# Patient Record
Sex: FEMALE | Race: WHITE | NOT HISPANIC OR LATINO
[De-identification: names, ages, dates, MRNs, and addresses within clinical notes are randomized per-mention and may not be internally consistent; named-entity substitution may affect disease eponyms.]

---

## 2023-05-16 ENCOUNTER — ASOB RESULT (OUTPATIENT)
Age: 33
End: 2023-05-16

## 2023-05-16 ENCOUNTER — TRANSCRIPTION ENCOUNTER (OUTPATIENT)
Age: 33
End: 2023-05-16

## 2023-05-16 ENCOUNTER — APPOINTMENT (OUTPATIENT)
Dept: ANTEPARTUM | Facility: CLINIC | Age: 33
End: 2023-05-16
Payer: COMMERCIAL

## 2023-05-16 PROCEDURE — 76805 OB US >/= 14 WKS SNGL FETUS: CPT

## 2023-05-16 PROCEDURE — 76817 TRANSVAGINAL US OBSTETRIC: CPT

## 2023-06-20 ENCOUNTER — APPOINTMENT (OUTPATIENT)
Dept: ANTEPARTUM | Facility: CLINIC | Age: 33
End: 2023-06-20
Payer: COMMERCIAL

## 2023-06-20 ENCOUNTER — ASOB RESULT (OUTPATIENT)
Age: 33
End: 2023-06-20

## 2023-06-20 PROCEDURE — 76811 OB US DETAILED SNGL FETUS: CPT

## 2023-06-27 ENCOUNTER — APPOINTMENT (OUTPATIENT)
Dept: ANTEPARTUM | Facility: CLINIC | Age: 33
End: 2023-06-27
Payer: COMMERCIAL

## 2023-06-27 ENCOUNTER — ASOB RESULT (OUTPATIENT)
Age: 33
End: 2023-06-27

## 2023-06-27 PROCEDURE — 76816 OB US FOLLOW-UP PER FETUS: CPT

## 2023-08-22 ENCOUNTER — APPOINTMENT (OUTPATIENT)
Dept: ANTEPARTUM | Facility: CLINIC | Age: 33
End: 2023-08-22
Payer: COMMERCIAL

## 2023-08-22 ENCOUNTER — ASOB RESULT (OUTPATIENT)
Age: 33
End: 2023-08-22

## 2023-08-22 PROCEDURE — 76816 OB US FOLLOW-UP PER FETUS: CPT

## 2023-08-22 PROCEDURE — 76819 FETAL BIOPHYS PROFIL W/O NST: CPT

## 2023-10-03 ENCOUNTER — APPOINTMENT (OUTPATIENT)
Dept: ANTEPARTUM | Facility: CLINIC | Age: 33
End: 2023-10-03
Payer: COMMERCIAL

## 2023-10-03 ENCOUNTER — ASOB RESULT (OUTPATIENT)
Age: 33
End: 2023-10-03

## 2023-10-03 PROCEDURE — 76816 OB US FOLLOW-UP PER FETUS: CPT

## 2023-10-03 PROCEDURE — 76820 UMBILICAL ARTERY ECHO: CPT | Mod: 59

## 2023-10-03 PROCEDURE — 76819 FETAL BIOPHYS PROFIL W/O NST: CPT | Mod: 59

## 2023-10-12 ENCOUNTER — INPATIENT (INPATIENT)
Facility: HOSPITAL | Age: 33
LOS: 2 days | Discharge: HOME | End: 2023-10-15
Attending: OBSTETRICS & GYNECOLOGY | Admitting: OBSTETRICS & GYNECOLOGY
Payer: COMMERCIAL

## 2023-10-12 DIAGNOSIS — Z3A.00 WEEKS OF GESTATION OF PREGNANCY NOT SPECIFIED: ICD-10-CM

## 2023-10-12 DIAGNOSIS — O26.899 OTHER SPECIFIED PREGNANCY RELATED CONDITIONS, UNSPECIFIED TRIMESTER: ICD-10-CM

## 2023-10-13 VITALS — HEIGHT: 68 IN | WEIGHT: 169.09 LBS

## 2023-10-13 LAB
BASOPHILS # BLD AUTO: 0.03 K/UL — SIGNIFICANT CHANGE UP (ref 0–0.2)
BASOPHILS NFR BLD AUTO: 0.2 % — SIGNIFICANT CHANGE UP (ref 0–2)
BLD GP AB SCN SERPL QL: NEGATIVE — SIGNIFICANT CHANGE UP
EOSINOPHIL # BLD AUTO: 0.02 K/UL — SIGNIFICANT CHANGE UP (ref 0–0.5)
EOSINOPHIL NFR BLD AUTO: 0.1 % — SIGNIFICANT CHANGE UP (ref 0–6)
HCT VFR BLD CALC: 32.8 % — LOW (ref 34.5–45)
HGB BLD-MCNC: 11.3 G/DL — LOW (ref 11.5–15.5)
IMM GRANULOCYTES NFR BLD AUTO: 0.7 % — SIGNIFICANT CHANGE UP (ref 0–0.9)
LYMPHOCYTES # BLD AUTO: 1.69 K/UL — SIGNIFICANT CHANGE UP (ref 1–3.3)
LYMPHOCYTES # BLD AUTO: 11.1 % — LOW (ref 13–44)
MCHC RBC-ENTMCNC: 30.4 PG — SIGNIFICANT CHANGE UP (ref 27–34)
MCHC RBC-ENTMCNC: 34.5 GM/DL — SIGNIFICANT CHANGE UP (ref 32–36)
MCV RBC AUTO: 88.2 FL — SIGNIFICANT CHANGE UP (ref 80–100)
MONOCYTES # BLD AUTO: 0.65 K/UL — SIGNIFICANT CHANGE UP (ref 0–0.9)
MONOCYTES NFR BLD AUTO: 4.3 % — SIGNIFICANT CHANGE UP (ref 2–14)
NEUTROPHILS # BLD AUTO: 12.71 K/UL — HIGH (ref 1.8–7.4)
NEUTROPHILS NFR BLD AUTO: 83.6 % — HIGH (ref 43–77)
NRBC # BLD: 0 /100 WBCS — SIGNIFICANT CHANGE UP (ref 0–0)
PLATELET # BLD AUTO: 201 K/UL — SIGNIFICANT CHANGE UP (ref 150–400)
RBC # BLD: 3.72 M/UL — LOW (ref 3.8–5.2)
RBC # FLD: 13.4 % — SIGNIFICANT CHANGE UP (ref 10.3–14.5)
RH IG SCN BLD-IMP: POSITIVE — SIGNIFICANT CHANGE UP
RH IG SCN BLD-IMP: POSITIVE — SIGNIFICANT CHANGE UP
T PALLIDUM AB TITR SER: NEGATIVE — SIGNIFICANT CHANGE UP
WBC # BLD: 15.2 K/UL — HIGH (ref 3.8–10.5)
WBC # FLD AUTO: 15.2 K/UL — HIGH (ref 3.8–10.5)

## 2023-10-13 RX ORDER — FAMOTIDINE 10 MG/ML
20 INJECTION INTRAVENOUS ONCE
Refills: 0 | Status: COMPLETED | OUTPATIENT
Start: 2023-10-13 | End: 2023-10-13

## 2023-10-13 RX ORDER — CITRIC ACID/SODIUM CITRATE 300-500 MG
15 SOLUTION, ORAL ORAL EVERY 6 HOURS
Refills: 0 | Status: DISCONTINUED | OUTPATIENT
Start: 2023-10-13 | End: 2023-10-13

## 2023-10-13 RX ORDER — SODIUM CHLORIDE 9 MG/ML
1000 INJECTION, SOLUTION INTRAVENOUS
Refills: 0 | Status: DISCONTINUED | OUTPATIENT
Start: 2023-10-13 | End: 2023-10-13

## 2023-10-13 RX ORDER — MAGNESIUM HYDROXIDE 400 MG/1
30 TABLET, CHEWABLE ORAL
Refills: 0 | Status: DISCONTINUED | OUTPATIENT
Start: 2023-10-13 | End: 2023-10-15

## 2023-10-13 RX ORDER — AER TRAVELER 0.5 G/1
1 SOLUTION RECTAL; TOPICAL EVERY 4 HOURS
Refills: 0 | Status: DISCONTINUED | OUTPATIENT
Start: 2023-10-13 | End: 2023-10-15

## 2023-10-13 RX ORDER — ACETAMINOPHEN 500 MG
1000 TABLET ORAL ONCE
Refills: 0 | Status: COMPLETED | OUTPATIENT
Start: 2023-10-13 | End: 2023-10-13

## 2023-10-13 RX ORDER — OXYCODONE HYDROCHLORIDE 5 MG/1
5 TABLET ORAL ONCE
Refills: 0 | Status: DISCONTINUED | OUTPATIENT
Start: 2023-10-13 | End: 2023-10-15

## 2023-10-13 RX ORDER — SIMETHICONE 80 MG/1
80 TABLET, CHEWABLE ORAL EVERY 4 HOURS
Refills: 0 | Status: DISCONTINUED | OUTPATIENT
Start: 2023-10-13 | End: 2023-10-15

## 2023-10-13 RX ORDER — DIPHENHYDRAMINE HCL 50 MG
25 CAPSULE ORAL EVERY 6 HOURS
Refills: 0 | Status: DISCONTINUED | OUTPATIENT
Start: 2023-10-13 | End: 2023-10-15

## 2023-10-13 RX ORDER — AMPICILLIN TRIHYDRATE 250 MG
CAPSULE ORAL
Refills: 0 | Status: DISCONTINUED | OUTPATIENT
Start: 2023-10-13 | End: 2023-10-15

## 2023-10-13 RX ORDER — ACETAMINOPHEN 500 MG
975 TABLET ORAL
Refills: 0 | Status: DISCONTINUED | OUTPATIENT
Start: 2023-10-13 | End: 2023-10-15

## 2023-10-13 RX ORDER — LANOLIN
1 OINTMENT (GRAM) TOPICAL EVERY 6 HOURS
Refills: 0 | Status: DISCONTINUED | OUTPATIENT
Start: 2023-10-13 | End: 2023-10-15

## 2023-10-13 RX ORDER — FENTANYL/BUPIVACAINE/NS/PF 2MCG/ML-.1
250 PLASTIC BAG, INJECTION (ML) INJECTION
Refills: 0 | Status: DISCONTINUED | OUTPATIENT
Start: 2023-10-13 | End: 2023-10-13

## 2023-10-13 RX ORDER — BENZOCAINE 10 %
1 GEL (GRAM) MUCOUS MEMBRANE EVERY 6 HOURS
Refills: 0 | Status: DISCONTINUED | OUTPATIENT
Start: 2023-10-13 | End: 2023-10-15

## 2023-10-13 RX ORDER — METOCLOPRAMIDE HCL 10 MG
10 TABLET ORAL ONCE
Refills: 0 | Status: COMPLETED | OUTPATIENT
Start: 2023-10-13 | End: 2023-10-13

## 2023-10-13 RX ORDER — DIBUCAINE 1 %
1 OINTMENT (GRAM) RECTAL EVERY 6 HOURS
Refills: 0 | Status: DISCONTINUED | OUTPATIENT
Start: 2023-10-13 | End: 2023-10-15

## 2023-10-13 RX ORDER — IBUPROFEN 200 MG
600 TABLET ORAL EVERY 6 HOURS
Refills: 0 | Status: DISCONTINUED | OUTPATIENT
Start: 2023-10-13 | End: 2023-10-15

## 2023-10-13 RX ORDER — CHLORHEXIDINE GLUCONATE 213 G/1000ML
1 SOLUTION TOPICAL DAILY
Refills: 0 | Status: DISCONTINUED | OUTPATIENT
Start: 2023-10-13 | End: 2023-10-13

## 2023-10-13 RX ORDER — GENTAMICIN SULFATE 40 MG/ML
380 VIAL (ML) INJECTION ONCE
Refills: 0 | Status: COMPLETED | OUTPATIENT
Start: 2023-10-13 | End: 2023-10-13

## 2023-10-13 RX ORDER — HYDROCORTISONE 1 %
1 OINTMENT (GRAM) TOPICAL EVERY 6 HOURS
Refills: 0 | Status: DISCONTINUED | OUTPATIENT
Start: 2023-10-13 | End: 2023-10-15

## 2023-10-13 RX ORDER — AMPICILLIN TRIHYDRATE 250 MG
2 CAPSULE ORAL EVERY 6 HOURS
Refills: 0 | Status: DISCONTINUED | OUTPATIENT
Start: 2023-10-13 | End: 2023-10-15

## 2023-10-13 RX ORDER — ONDANSETRON 8 MG/1
4 TABLET, FILM COATED ORAL ONCE
Refills: 0 | Status: COMPLETED | OUTPATIENT
Start: 2023-10-13 | End: 2023-10-13

## 2023-10-13 RX ORDER — OXYCODONE HYDROCHLORIDE 5 MG/1
5 TABLET ORAL
Refills: 0 | Status: DISCONTINUED | OUTPATIENT
Start: 2023-10-13 | End: 2023-10-15

## 2023-10-13 RX ORDER — OXYTOCIN 10 UNIT/ML
41.67 VIAL (ML) INJECTION
Qty: 20 | Refills: 0 | Status: DISCONTINUED | OUTPATIENT
Start: 2023-10-13 | End: 2023-10-15

## 2023-10-13 RX ORDER — PRAMOXINE HYDROCHLORIDE 150 MG/15G
1 AEROSOL, FOAM RECTAL EVERY 4 HOURS
Refills: 0 | Status: DISCONTINUED | OUTPATIENT
Start: 2023-10-13 | End: 2023-10-15

## 2023-10-13 RX ORDER — SODIUM CHLORIDE 9 MG/ML
3 INJECTION INTRAMUSCULAR; INTRAVENOUS; SUBCUTANEOUS EVERY 8 HOURS
Refills: 0 | Status: DISCONTINUED | OUTPATIENT
Start: 2023-10-13 | End: 2023-10-15

## 2023-10-13 RX ORDER — KETOROLAC TROMETHAMINE 30 MG/ML
30 SYRINGE (ML) INJECTION ONCE
Refills: 0 | Status: DISCONTINUED | OUTPATIENT
Start: 2023-10-13 | End: 2023-10-13

## 2023-10-13 RX ORDER — ACETAMINOPHEN 500 MG
1000 TABLET ORAL ONCE
Refills: 0 | Status: DISCONTINUED | OUTPATIENT
Start: 2023-10-13 | End: 2023-10-15

## 2023-10-13 RX ORDER — TETANUS TOXOID, REDUCED DIPHTHERIA TOXOID AND ACELLULAR PERTUSSIS VACCINE, ADSORBED 5; 2.5; 8; 8; 2.5 [IU]/.5ML; [IU]/.5ML; UG/.5ML; UG/.5ML; UG/.5ML
0.5 SUSPENSION INTRAMUSCULAR ONCE
Refills: 0 | Status: COMPLETED | OUTPATIENT
Start: 2023-10-13

## 2023-10-13 RX ORDER — AMPICILLIN TRIHYDRATE 250 MG
2 CAPSULE ORAL ONCE
Refills: 0 | Status: COMPLETED | OUTPATIENT
Start: 2023-10-13 | End: 2023-10-13

## 2023-10-13 RX ORDER — OXYTOCIN 10 UNIT/ML
333.33 VIAL (ML) INJECTION
Qty: 20 | Refills: 0 | Status: DISCONTINUED | OUTPATIENT
Start: 2023-10-13 | End: 2023-10-13

## 2023-10-13 RX ORDER — IBUPROFEN 200 MG
600 TABLET ORAL EVERY 6 HOURS
Refills: 0 | Status: COMPLETED | OUTPATIENT
Start: 2023-10-13 | End: 2024-09-10

## 2023-10-13 RX ADMIN — Medication 975 MILLIGRAM(S): at 13:30

## 2023-10-13 RX ADMIN — Medication 216 GRAM(S): at 15:09

## 2023-10-13 RX ADMIN — Medication 10 MILLIGRAM(S): at 15:10

## 2023-10-13 RX ADMIN — ONDANSETRON 4 MILLIGRAM(S): 8 TABLET, FILM COATED ORAL at 10:29

## 2023-10-13 RX ADMIN — Medication 1000 MILLIGRAM(S): at 06:07

## 2023-10-13 RX ADMIN — Medication 975 MILLIGRAM(S): at 20:00

## 2023-10-13 RX ADMIN — Medication 400 MILLIGRAM(S): at 05:30

## 2023-10-13 RX ADMIN — Medication 975 MILLIGRAM(S): at 19:23

## 2023-10-13 RX ADMIN — Medication 30 MILLIGRAM(S): at 14:38

## 2023-10-13 RX ADMIN — SODIUM CHLORIDE 125 MILLILITER(S): 9 INJECTION, SOLUTION INTRAVENOUS at 02:02

## 2023-10-13 RX ADMIN — SODIUM CHLORIDE 125 MILLILITER(S): 9 INJECTION, SOLUTION INTRAVENOUS at 11:06

## 2023-10-13 RX ADMIN — Medication 200 MILLIGRAM(S): at 15:34

## 2023-10-13 RX ADMIN — FAMOTIDINE 20 MILLIGRAM(S): 10 INJECTION INTRAVENOUS at 05:07

## 2023-10-13 RX ADMIN — Medication 30 MILLIGRAM(S): at 15:00

## 2023-10-13 RX ADMIN — Medication 216 GRAM(S): at 22:35

## 2023-10-13 NOTE — PRE-ANESTHESIA EVALUATION ADULT - NSANTHPMHFT_GEN_ALL_CORE
Pt 33yo  at 37+4 presenting for r/o labor. She reports ctx starting at 1900, q2-4min, 7/10 in severity. Reports good FM, denies LOF. Reports mild spotting since cervical exam yesterday, some bloody show this morning.  SVE yesterday: 1cm.     Ante: spontaneous conception. NIPT LR. Anatomy scanl wnl. Passed GCT. No elevated BP this pregnancy. GBS-. EFW 3320g.     OBHx: G1  GYNHx: chlamydia '16 s/p tx. Denies abnormal pap, cysts, fibroids  PMHX: denies  PSH: eyelid surgery .  meds: PNV  social: denies T/A/D  allergies: denies

## 2023-10-13 NOTE — LACTATION INITIAL EVALUATION - NS LACT CON REASON FOR REQ
gestation 37.5. infant sleepy at time of eval. skin to skin promoted and HE taught and demonstrated. infant given 1cc of EBM. breastfeeding education reviewed./primaparous mom

## 2023-10-14 ENCOUNTER — TRANSCRIPTION ENCOUNTER (OUTPATIENT)
Age: 33
End: 2023-10-14

## 2023-10-14 RX ORDER — IBUPROFEN 200 MG
1 TABLET ORAL
Qty: 0 | Refills: 0 | DISCHARGE
Start: 2023-10-14

## 2023-10-14 RX ORDER — TETANUS TOXOID, REDUCED DIPHTHERIA TOXOID AND ACELLULAR PERTUSSIS VACCINE, ADSORBED 5; 2.5; 8; 8; 2.5 [IU]/.5ML; [IU]/.5ML; UG/.5ML; UG/.5ML; UG/.5ML
0.5 SUSPENSION INTRAMUSCULAR ONCE
Refills: 0 | Status: COMPLETED | OUTPATIENT
Start: 2023-10-14 | End: 2023-10-14

## 2023-10-14 RX ORDER — ACETAMINOPHEN 500 MG
100 TABLET ORAL
Qty: 0 | Refills: 0 | DISCHARGE
Start: 2023-10-14

## 2023-10-14 RX ORDER — ACETAMINOPHEN 500 MG
3 TABLET ORAL
Qty: 0 | Refills: 0 | DISCHARGE
Start: 2023-10-14

## 2023-10-14 RX ADMIN — Medication 600 MILLIGRAM(S): at 13:30

## 2023-10-14 RX ADMIN — Medication 600 MILLIGRAM(S): at 12:47

## 2023-10-14 RX ADMIN — Medication 975 MILLIGRAM(S): at 03:03

## 2023-10-14 RX ADMIN — SODIUM CHLORIDE 3 MILLILITER(S): 9 INJECTION INTRAMUSCULAR; INTRAVENOUS; SUBCUTANEOUS at 22:00

## 2023-10-14 RX ADMIN — Medication 1 TABLET(S): at 12:47

## 2023-10-14 RX ADMIN — Medication 600 MILLIGRAM(S): at 00:32

## 2023-10-14 RX ADMIN — Medication 600 MILLIGRAM(S): at 06:52

## 2023-10-14 RX ADMIN — SODIUM CHLORIDE 3 MILLILITER(S): 9 INJECTION INTRAMUSCULAR; INTRAVENOUS; SUBCUTANEOUS at 00:32

## 2023-10-14 RX ADMIN — Medication 216 GRAM(S): at 03:02

## 2023-10-14 RX ADMIN — TETANUS TOXOID, REDUCED DIPHTHERIA TOXOID AND ACELLULAR PERTUSSIS VACCINE, ADSORBED 0.5 MILLILITER(S): 5; 2.5; 8; 8; 2.5 SUSPENSION INTRAMUSCULAR at 12:48

## 2023-10-14 RX ADMIN — Medication 975 MILLIGRAM(S): at 16:30

## 2023-10-14 RX ADMIN — Medication 600 MILLIGRAM(S): at 18:23

## 2023-10-14 RX ADMIN — Medication 975 MILLIGRAM(S): at 21:00

## 2023-10-14 RX ADMIN — Medication 216 GRAM(S): at 09:26

## 2023-10-14 RX ADMIN — Medication 975 MILLIGRAM(S): at 15:41

## 2023-10-14 RX ADMIN — Medication 975 MILLIGRAM(S): at 09:35

## 2023-10-14 RX ADMIN — Medication 975 MILLIGRAM(S): at 10:15

## 2023-10-14 RX ADMIN — Medication 600 MILLIGRAM(S): at 00:30

## 2023-10-14 RX ADMIN — Medication 600 MILLIGRAM(S): at 06:54

## 2023-10-14 RX ADMIN — Medication 216 GRAM(S): at 21:01

## 2023-10-14 RX ADMIN — Medication 975 MILLIGRAM(S): at 22:00

## 2023-10-14 RX ADMIN — SODIUM CHLORIDE 3 MILLILITER(S): 9 INJECTION INTRAMUSCULAR; INTRAVENOUS; SUBCUTANEOUS at 06:54

## 2023-10-14 RX ADMIN — Medication 600 MILLIGRAM(S): at 19:00

## 2023-10-14 RX ADMIN — Medication 975 MILLIGRAM(S): at 02:58

## 2023-10-14 RX ADMIN — Medication 216 GRAM(S): at 15:41

## 2023-10-14 NOTE — DISCHARGE NOTE OB - CARE PROVIDER_API CALL
Kelly Peres  Obstetrics and Gynecology  [address] [place] [address]  Phone: [phone]  Fax: [phone]  Follow Up Time:

## 2023-10-14 NOTE — DISCHARGE NOTE OB - PLAN OF CARE
Take Motrin 600mg every 6 hours and/or tylenol 650mg every 6 hours as needed for pain. Call your OB to schedule a follow up appointment in 6 weeks. Nothing per vagina until cleared by your OB - no intercourse, douching, tampons, etc.  Call your OB if you experience severe abdominal pain not improved by oral pain medications, heavy bright red vaginal bleeding saturating more than 1 pad per hour, or fever greater than 100.4F. Consider contraception options to be discussed with your OB. call your doctor if you develop any fevers of 100.4 degrees F or greater.

## 2023-10-14 NOTE — DISCHARGE NOTE OB - MEDICATION SUMMARY - MEDICATIONS TO TAKE
I will START or STAY ON the medications listed below when I get home from the hospital:    acetaminophen 325 mg oral tablet  -- 3 tab(s) by mouth every 6 hours  -- Indication: For pain    ibuprofen 600 mg oral tablet  -- 1 tab(s) by mouth every 6 hours  -- Indication: For pain    acetaminophen 10 mg/mL intravenous solution  -- 100 milliliter(s) intravenous once  -- Indication: For pain    Prenatal Multivitamins with Folic Acid 1 mg oral tablet  -- 1 tab(s) by mouth once a day  -- Indication: For postpartum state

## 2023-10-14 NOTE — DISCHARGE NOTE OB - NS MD DC FALL RISK RISK
For information on Fall & Injury Prevention, visit: [email or web address] OR  [email or web address] OR  https://www.cdc.gov/steadi/patient.html

## 2023-10-14 NOTE — DISCHARGE NOTE OB - PATIENT PORTAL LINK FT
You can access the FollowMyHealth Patient Portal offered by [facility] by registering at the following website: [email or web address]. By joining [employer]’s FollowMyHealth portal, you will also be able to view your health information using other applications (apps) compatible with our system.

## 2023-10-14 NOTE — DISCHARGE NOTE OB - HOSPITAL COURSE
Patient had uncomplicated, nonsurgical vaginal delivery.  Please see delivery note for details. Pregnancy c/b chorioamnionitis, s/p IV amp and gent. During postpartum course patient's vitals were stable, vaginal bleeding appropriate, and pain well controlled.  On day of discharge patient was ambulating, her pain controlled with oral medications, had adequate oral intake, and was voiding freely.  Discharge instructions and precautions were given.  Will return to clinic in 6 weeks for postpartum visit.

## 2023-10-14 NOTE — DISCHARGE NOTE OB - CARE PLAN
Principal Discharge DX:	Postpartum state  Assessment and plan of treatment:	Take Motrin 600mg every 6 hours and/or tylenol 650mg every 6 hours as needed for pain. Call your OB to schedule a follow up appointment in 6 weeks. Nothing per vagina until cleared by your OB - no intercourse, douching, tampons, etc.  Call your OB if you experience severe abdominal pain not improved by oral pain medications, heavy bright red vaginal bleeding saturating more than 1 pad per hour, or fever greater than 100.4F. Consider contraception options to be discussed with your OB.  Secondary Diagnosis:	Chorioamnionitis  Assessment and plan of treatment:	call your doctor if you develop any fevers of 100.4 degrees F or greater.   1

## 2023-10-14 NOTE — PROGRESS NOTE ADULT - SUBJECTIVE AND OBJECTIVE BOX
Patient evaluated at bedside this morning, resting comfortable in bed, no acute events overnight.  She reports pain is well controlled with tylenol and motrin.  She denies heavy vaginal bleeding. She has been ambulating without assistance, voiding spontaneously.  Tolerating food well, without nausea/vomit.    She denies fever, chills.    Physical Exam:  T(C): 36.4 (10-14-23 @ 05:00), Max: 36.6 (10-13-23 @ 18:00)  HR: 78 (10-14-23 @ 05:00) (67 - 78)  BP: 97/61 (10-14-23 @ 05:00) (94/58 - 98/64)  RR: 18 (10-14-23 @ 05:00) (18 - 18)  SpO2: 97% (10-14-23 @ 05:00) (97% - 98%)    GA: comfortable appearing, NAD  Pulm: no increased work of breathing  Abd: soft, nontender, nondistended, no rebound or guarding, uterus firm.  Extremities: no calf tenderness, no lower extremity edema                          11.3   15.20 )-----------( 201      ( 13 Oct 2023 00:31 )             32.8           acetaminophen     Tablet .. 975 milliGRAM(s) Oral <User Schedule>  acetaminophen   IVPB .. 1000 milliGRAM(s) IV Intermittent once  ampicillin  IVPB      ampicillin  IVPB 2 Gram(s) IV Intermittent every 6 hours  benzocaine 20%/menthol 0.5% Spray 1 Spray(s) Topical every 6 hours PRN  dibucaine 1% Ointment 1 Application(s) Topical every 6 hours PRN  diphenhydrAMINE 25 milliGRAM(s) Oral every 6 hours PRN  diphtheria/tetanus/pertussis (acellular) Vaccine (Adacel) 0.5 milliLiter(s) IntraMuscular once  hydrocortisone 1% Cream 1 Application(s) Topical every 6 hours PRN  ibuprofen  Tablet. 600 milliGRAM(s) Oral every 6 hours  lanolin Ointment 1 Application(s) Topical every 6 hours PRN  magnesium hydroxide Suspension 30 milliLiter(s) Oral two times a day PRN  oxyCODONE    IR 5 milliGRAM(s) Oral every 3 hours PRN  oxyCODONE    IR 5 milliGRAM(s) Oral once PRN  oxytocin Infusion 41.667 milliUNIT(s)/Min IV Continuous <Continuous>  pramoxine 1%/zinc 5% Cream 1 Application(s) Topical every 4 hours PRN  prenatal multivitamin 1 Tablet(s) Oral daily  simethicone 80 milliGRAM(s) Chew every 4 hours PRN  sodium chloride 0.9% lock flush 3 milliLiter(s) IV Push every 8 hours  witch hazel Pads 1 Application(s) Topical every 4 hours PRN

## 2023-10-15 VITALS
RESPIRATION RATE: 18 BRPM | HEART RATE: 68 BPM | DIASTOLIC BLOOD PRESSURE: 61 MMHG | TEMPERATURE: 98 F | SYSTOLIC BLOOD PRESSURE: 99 MMHG | OXYGEN SATURATION: 98 %

## 2023-10-15 PROCEDURE — 86850 RBC ANTIBODY SCREEN: CPT

## 2023-10-15 PROCEDURE — 36415 COLL VENOUS BLD VENIPUNCTURE: CPT

## 2023-10-15 PROCEDURE — 86900 BLOOD TYPING SEROLOGIC ABO: CPT

## 2023-10-15 PROCEDURE — 99214 OFFICE O/P EST MOD 30 MIN: CPT

## 2023-10-15 PROCEDURE — 86780 TREPONEMA PALLIDUM: CPT

## 2023-10-15 PROCEDURE — 86901 BLOOD TYPING SEROLOGIC RH(D): CPT

## 2023-10-15 PROCEDURE — 85025 COMPLETE CBC W/AUTO DIFF WBC: CPT

## 2023-10-15 PROCEDURE — 90715 TDAP VACCINE 7 YRS/> IM: CPT

## 2023-10-15 RX ADMIN — Medication 975 MILLIGRAM(S): at 03:30

## 2023-10-15 RX ADMIN — SODIUM CHLORIDE 3 MILLILITER(S): 9 INJECTION INTRAMUSCULAR; INTRAVENOUS; SUBCUTANEOUS at 06:00

## 2023-10-15 RX ADMIN — Medication 975 MILLIGRAM(S): at 14:47

## 2023-10-15 RX ADMIN — Medication 600 MILLIGRAM(S): at 00:25

## 2023-10-15 RX ADMIN — Medication 600 MILLIGRAM(S): at 07:06

## 2023-10-15 RX ADMIN — Medication 600 MILLIGRAM(S): at 06:26

## 2023-10-15 RX ADMIN — Medication 600 MILLIGRAM(S): at 01:15

## 2023-10-15 RX ADMIN — Medication 975 MILLIGRAM(S): at 02:37

## 2023-10-15 RX ADMIN — Medication 1 TABLET(S): at 12:19

## 2023-10-15 RX ADMIN — Medication 975 MILLIGRAM(S): at 09:25

## 2023-10-15 RX ADMIN — Medication 600 MILLIGRAM(S): at 12:20

## 2023-10-15 NOTE — PROGRESS NOTE ADULT - ASSESSMENT
A/P 32y s/p , PPD2 , stable, meeting postpartum milestones   - Pain: well controlled on tylenol/motrin  - GI: Tolerating regular diet  - : urinating without difficulty/pain  - DVT prophylaxis: ambulating frequently  - Dispo: PPD 2, unless otherwise specified    
A/P 32y s/p  c/b chorioamnionitis, PPD# 1, stable, meeting postpartum milestones   - Pain: well controlled on tylenol/motrin  - chorioamnionitis: s/p ampicillin/gentamicin, afebrile  - GI: Tolerating regular diet  - : urinating without difficulty/pain  - DVT prophylaxis: ambulating frequently  - Dispo: PPD 2, unless otherwise specified

## 2023-10-15 NOTE — PROGRESS NOTE ADULT - SUBJECTIVE AND OBJECTIVE BOX
Patient evaluated at bedside this morning, resting comfortable in bed, no acute events overnight.  She reports pain is well controlled with tylenol and motrin.  She denies nausea, vomiting, fever, chills, heavy vaginal bleeding. She has been ambulating without assistance, voiding spontaneously.  Tolerating food well, without nausea/vomit.      Physical Exam:  T(C): 36.7 (10-15-23 @ 05:37), Max: 36.8 (10-14-23 @ 22:00)  HR: 65 (10-15-23 @ 05:37) (65 - 78)  BP: 110/73 (10-15-23 @ 05:37) (97/62 - 110/73)  RR: 18 (10-15-23 @ 05:37) (18 - 18)  SpO2: 99% (10-15-23 @ 05:37) (98% - 99%)    GA: lying comfortably in bed, NAD  Pulm: no increased work of breathing  Abd: soft, nontender, nondistended, no rebound or guarding, uterus firm.  Extremities: no swelling or calf tenderness            acetaminophen     Tablet .. 975 milliGRAM(s) Oral <User Schedule>  acetaminophen   IVPB .. 1000 milliGRAM(s) IV Intermittent once  benzocaine 20%/menthol 0.5% Spray 1 Spray(s) Topical every 6 hours PRN  dibucaine 1% Ointment 1 Application(s) Topical every 6 hours PRN  diphenhydrAMINE 25 milliGRAM(s) Oral every 6 hours PRN  hydrocortisone 1% Cream 1 Application(s) Topical every 6 hours PRN  ibuprofen  Tablet. 600 milliGRAM(s) Oral every 6 hours  lanolin Ointment 1 Application(s) Topical every 6 hours PRN  magnesium hydroxide Suspension 30 milliLiter(s) Oral two times a day PRN  oxyCODONE    IR 5 milliGRAM(s) Oral every 3 hours PRN  oxyCODONE    IR 5 milliGRAM(s) Oral once PRN  oxytocin Infusion 41.667 milliUNIT(s)/Min IV Continuous <Continuous>  pramoxine 1%/zinc 5% Cream 1 Application(s) Topical every 4 hours PRN  prenatal multivitamin 1 Tablet(s) Oral daily  simethicone 80 milliGRAM(s) Chew every 4 hours PRN  sodium chloride 0.9% lock flush 3 milliLiter(s) IV Push every 8 hours  witch hazel Pads 1 Application(s) Topical every 4 hours PRN

## 2023-10-17 PROBLEM — Z00.00 ENCOUNTER FOR PREVENTIVE HEALTH EXAMINATION: Status: ACTIVE | Noted: 2023-10-17

## 2023-10-18 DIAGNOSIS — Z28.21 IMMUNIZATION NOT CARRIED OUT BECAUSE OF PATIENT REFUSAL: ICD-10-CM

## 2023-10-18 DIAGNOSIS — Z3A.37 37 WEEKS GESTATION OF PREGNANCY: ICD-10-CM

## 2023-10-18 DIAGNOSIS — Z28.09 IMMUNIZATION NOT CARRIED OUT BECAUSE OF OTHER CONTRAINDICATION: ICD-10-CM

## 2023-10-18 DIAGNOSIS — Z71.85 ENCOUNTER FOR IMMUNIZATION SAFETY COUNSELING: ICD-10-CM

## 2023-10-18 DIAGNOSIS — Z23 ENCOUNTER FOR IMMUNIZATION: ICD-10-CM

## 2023-10-18 DIAGNOSIS — O41.1230 CHORIOAMNIONITIS, THIRD TRIMESTER, NOT APPLICABLE OR UNSPECIFIED: ICD-10-CM

## 2024-11-05 ENCOUNTER — RX ONLY (RX ONLY)
Age: 34
End: 2024-11-05

## 2024-11-05 ENCOUNTER — OFFICE (OUTPATIENT)
Dept: URBAN - METROPOLITAN AREA CLINIC 28 | Facility: CLINIC | Age: 34
Setting detail: OPHTHALMOLOGY
End: 2024-11-05
Payer: COMMERCIAL

## 2024-11-05 DIAGNOSIS — B30.9: ICD-10-CM

## 2024-11-05 PROCEDURE — 99203 OFFICE O/P NEW LOW 30 MIN: CPT | Performed by: OPHTHALMOLOGY

## 2024-11-05 ASSESSMENT — CONFRONTATIONAL VISUAL FIELD TEST (CVF)
OD_FINDINGS: FULL
OS_FINDINGS: FULL

## 2024-11-05 ASSESSMENT — VISUAL ACUITY
OD_BCVA: 20/20
OS_BCVA: 20/20